# Patient Record
Sex: FEMALE | Race: WHITE | NOT HISPANIC OR LATINO | URBAN - METROPOLITAN AREA
[De-identification: names, ages, dates, MRNs, and addresses within clinical notes are randomized per-mention and may not be internally consistent; named-entity substitution may affect disease eponyms.]

---

## 2021-11-09 ENCOUNTER — EMERGENCY (EMERGENCY)
Facility: HOSPITAL | Age: 86
LOS: 1 days | Discharge: HOME | End: 2021-11-09
Attending: EMERGENCY MEDICINE
Payer: MEDICARE

## 2021-11-09 VITALS
OXYGEN SATURATION: 97 % | SYSTOLIC BLOOD PRESSURE: 176 MMHG | HEART RATE: 61 BPM | DIASTOLIC BLOOD PRESSURE: 66 MMHG | RESPIRATION RATE: 17 BRPM | TEMPERATURE: 98 F

## 2021-11-09 VITALS
SYSTOLIC BLOOD PRESSURE: 170 MMHG | OXYGEN SATURATION: 97 % | TEMPERATURE: 98 F | WEIGHT: 100.97 LBS | RESPIRATION RATE: 17 BRPM | HEIGHT: 62 IN | HEART RATE: 60 BPM | DIASTOLIC BLOOD PRESSURE: 67 MMHG

## 2021-11-09 PROCEDURE — 70450 CT HEAD/BRAIN W/O DYE: CPT | Mod: 26,MA

## 2021-11-09 PROCEDURE — 70450 CT HEAD/BRAIN W/O DYE: CPT | Mod: MA

## 2021-11-09 PROCEDURE — 99284 EMERGENCY DEPT VISIT MOD MDM: CPT | Mod: 25

## 2021-11-09 PROCEDURE — 93005 ELECTROCARDIOGRAM TRACING: CPT

## 2021-11-09 PROCEDURE — 99284 EMERGENCY DEPT VISIT MOD MDM: CPT

## 2021-11-09 PROCEDURE — 82962 GLUCOSE BLOOD TEST: CPT

## 2021-11-09 NOTE — ED PROVIDER NOTE - CLINICAL SUMMARY MEDICAL DECISION MAKING FREE TEXT BOX
89 yo F from home, AAOx3, presents with possible TIA. No stroke code called given pt's advanced age and NIH 0 on arrival. Plan - CT head, EKG, reassess.

## 2021-11-09 NOTE — ED PROVIDER NOTE - OBJECTIVE STATEMENT
89 yo F pmh of TIA, PPM, presents from home stating "I think I had a TIA." Per pt, she was with HHA (has 24 hour HHA) and told her "my speech sounded like garbage." This lasted a few minutes and then resolved. Pt has been at baseline since then. Denies HA, visual changes, weakness, syncope, other acute complaints.

## 2021-11-09 NOTE — ED PROVIDER NOTE - PATIENT PORTAL LINK FT
You can access the FollowMyHealth Patient Portal offered by [facility] by registering at the following website: [email or web address]. By joining [facility]’s FollowMyHealth portal, you will also be able to view your health information using other applications (apps) compatible with our system.

## 2021-11-09 NOTE — ED PROVIDER NOTE - NS ED ROS FT
CONSTITUTIONAL: no fever, no chills   EYES: no visual changes, no eye pain   ENMT: no nasal congestion, no throat pain  CARDIOVASCULAR: no chest pain, no edema, no palpitations   RESPIRATORY: no shortness of breath, no cough   GASTROINTESTINAL: no abdominal pain, no nausea, no vomiting, no diarrhea, no constipation   GENITOURINARY: no dysuria, no frequency  MUSCULOSKELETAL: no joint pains, no myalgias, no back pain   SKIN: no rashes  NEUROLOGICAL: no weakness, no headache, no dizziness, +slurred speech, no syncope   PSYCHIATRIC: no known mental health illness   HEME/LYMPH: no lymphadenopathy      All other ROS negative except as per HPI

## 2021-11-09 NOTE — ED PROVIDER NOTE - PHYSICAL EXAMINATION
GENERAL: well appearing, no acute distress   HEAD: atraumatic   EYES: EOMI, pink conjunctiva   ENT: moist oral mucosa   CARDIAC: RRR, no edema, distal pulses present   RESPIRATORY: lungs CTAB, no increased work of breathing   GASTROINTESTINAL: no abdominal tenderness, no rebound or guarding, bowel sounds presents  GENITOURINARY: no CVA tenderness   MUSCULOSKELETAL: no deformity   NEUROLOGICAL: AAOx3, CN's II-XII intact, strength 5/5 bilateral UE and LE, sensation intact to light touch, finger to nose intact  SKIN: intact   PSYCHIATRIC: cooperative

## 2021-11-09 NOTE — ED PROVIDER NOTE - NSFOLLOWUPINSTRUCTIONS_ED_ALL_ED_FT
Stroke Prevention      Some medical conditions and lifestyle choices can lead to a higher risk for a stroke. You can help to prevent a stroke by eating healthy foods and exercising. It also helps to not smoke and to manage any health problems you may have.      How can this condition affect me?    A stroke is an emergency. It should be treated right away. A stroke can lead to brain damage or threaten your life. There is a better chance of surviving and getting better after a stroke if you get medical help right away.      What can increase my risk?  The following medical conditions may increase your risk of a stroke:  •Diseases of the heart and blood vessels (cardiovascular disease).      •High blood pressure (hypertension).      •Diabetes.      •High cholesterol.      •Sickle cell disease.      •Problems with blood clotting.      •Being very overweight.      •Sleeping problems (obstructivesleep apnea).    Other risk factors include:  •Being older than age 60.      •A history of blood clots, stroke, or mini-stroke (TIA).      •Race, ethnic background, or a family history of stroke.      •Smoking or using tobacco products.      •Taking birth control pills, especially if you smoke.      •Heavy alcohol and drug use.      •Not being active.        What actions can I take to prevent this?    Manage your health conditions   •High cholesterol.  •Eat a healthy diet. If this is not enough to manage your cholesterol, you may need to take medicines.      •Take medicines as told by your doctor.      •High blood pressure.  •Try to keep your blood pressure below 130/80.      •If your blood pressure cannot be managed through a healthy diet and regular exercise, you may need to take medicines.      •Take medicines as told by your doctor.      •Ask your doctor if you should check your blood pressure at home.      •Have your blood pressure checked every year.      •Diabetes.  •Eat a healthy diet and get regular exercise. If your blood sugar (glucose) cannot be managed through diet and exercise, you may need to take medicines.      •Take medicines as told by your doctor.      •Talk to your doctor about getting checked for sleeping problems. Signs of a problem can include:  •Snoring a lot.      •Feeling very tired.        •Make sure that you manage any other conditions you have.        Nutrition    •Follow instructions from your doctor about what to eat or drink. You may be told to:  •Eat and drink fewer calories each day.      •Limit how much salt (sodium) you use to 1,500 milligrams (mg) each day.      •Use only healthy fats for cooking, such as olive oil, canola oil, and sunflower oil.    •Eat healthy foods. To do this:  •Choose foods that are high in fiber. These include whole grains, and fresh fruits and vegetables.      •Eat at least 5 servings of fruits and vegetables a day. Try to fill one-half of your plate with fruits and vegetables at each meal.      •Choose low-fat (lean) proteins. These include low-fat cuts of meat, chicken without skin, fish, tofu, beans, and nuts.      •Eat low-fat dairy products.      •Avoid foods that:  •Are high in salt.      •Have saturated fat.      •Have trans fat.      •Have cholesterol.      •Are processed or pre-made.        •Count how many carbohydrates you eat and drink each day.        Lifestyle   •If you drink alcohol:•Limit how much you have to:  •0–1 drink a day for women who are not pregnant.      •0–2 drinks a day for men.        •Know how much alcohol is in your drink. In the [place] one drink equals one 12 oz bottle of beer (355mL), one 5 oz glass of wine (148mL), or one 1½ oz glass of hard liquor (44mL).        • Do not smoke or use any products that have nicotine or tobacco. If you need help quitting, ask your doctor.      •Avoid secondhand smoke.      • Do not use drugs.        Activity      •Try to stay at a healthy weight.    •Get at least 30 minutes of exercise on most days, such as:  •Fast walking.      •Biking.      •Swimming.        Medicines     •Take over-the-counter and prescription medicines only as told by your doctor.    •Avoid taking birth control pills. Talk to your doctor about the risks of taking birth control pills if:  •You are over 35 years old.      •You smoke.      •You get very bad headaches.      •You have had a blood clot.          Where to find more information    •American Stroke Association: www.strokeassociation.org        Get help right away if:  •You or a loved one has any signs of a stroke. "BE FAST" is an easy way to remember the warning signs:  •B - Balance. Dizziness, sudden trouble walking, or loss of balance.      •E - Eyes. Trouble seeing or a change in how you see.      •F - Face. Sudden weakness or loss of feeling of the face. The face or eyelid may droop on one side.      •A - Arms. Weakness or loss of feeling in an arm. This happens all of a sudden and most often on one side of the body.      •S - Speech. Sudden trouble speaking, slurred speech, or trouble understanding what people say.      •T - Time. Time to call emergency services. Write down what time symptoms started.      •You or a loved one has other signs of a stroke, such as:  •A sudden, very bad headache with no known cause.      •Feeling like you may vomit (nausea).       •Vomiting.      •A seizure.      These symptoms may be an emergency. Get help right away. Call your local emergency services (911 in the [place]    • Do not wait to see if the symptoms will go away.       • Do not drive yourself to the hospital.         Summary    •You can help to prevent a stroke by eating healthy, exercising, and not smoking. It also helps to manage any health problems you have.      • Do not smoke or use any products that contain nicotine or tobacco.      •Get help right away if you or a loved one has any signs of a stroke.      This information is not intended to replace advice given to you by your health care provider. Make sure you discuss any questions you have with your health care provider.      Document Revised: 07/19/2021 Document Reviewed: 07/19/2021    Elsevier Patient Education © 2021 [employer]

## 2021-11-09 NOTE — ED PROVIDER NOTE - NSFOLLOWUPCLINICS_GEN_ALL_ED_FT
[facility]  Neurology  [address] [place] [address]  Phone: [phone]  Fax: [phone]

## 2021-11-09 NOTE — ED PROVIDER NOTE - PROGRESS NOTE DETAILS
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 Pt currently on aspirin, Plavix, BP control, HTN meds - medically optimized for 2/2 stroke prevention.   Given that admission will not  and pt is doing exceptionally well living alone at home, pt would like to be discharged. Son at bedside agrees with plan. Will dc. Discussed indications for patient return to ED. Patient understood.